# Patient Record
Sex: MALE | Race: WHITE
[De-identification: names, ages, dates, MRNs, and addresses within clinical notes are randomized per-mention and may not be internally consistent; named-entity substitution may affect disease eponyms.]

---

## 2019-07-26 ENCOUNTER — HOSPITAL ENCOUNTER (EMERGENCY)
Dept: HOSPITAL 65 - ER | Age: 24
Discharge: HOME | End: 2019-07-26
Payer: COMMERCIAL

## 2019-07-26 VITALS — SYSTOLIC BLOOD PRESSURE: 142 MMHG | DIASTOLIC BLOOD PRESSURE: 83 MMHG

## 2019-07-26 VITALS — WEIGHT: 180 LBS | BODY MASS INDEX: 25.2 KG/M2 | HEIGHT: 71 IN

## 2019-07-26 VITALS — SYSTOLIC BLOOD PRESSURE: 132 MMHG | DIASTOLIC BLOOD PRESSURE: 89 MMHG

## 2019-07-26 VITALS — SYSTOLIC BLOOD PRESSURE: 149 MMHG | DIASTOLIC BLOOD PRESSURE: 92 MMHG
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
## 2019-07-26 DIAGNOSIS — M54.2: ICD-10-CM
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
## 2019-07-26 DIAGNOSIS — S00.83XA: Primary | ICD-10-CM
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
## 2019-07-26 PROCEDURE — 99284 EMERGENCY DEPT VISIT MOD MDM: CPT

## 2019-07-26 PROCEDURE — 70450 CT HEAD/BRAIN W/O DYE: CPT

## 2019-07-26 PROCEDURE — 72125 CT NECK SPINE W/O DYE: CPT

## 2019-07-26 NOTE — DIREP
PROCEDURE:CT CERVICAL SPINE WITHOUT CONTRAST

 

TECHNIQUE:Axial cuts were obtained through the cervical spine.  The images 

were viewed at bone and soft tissue settings.  Sagittal and coronal 

reconstructions are provided.  

 

COMPARISON:[facility], CT, CT HEAD BRAIN W/O CONTRAST, 

07/26/2019, 10:38 PM.

 

INDICATIONS:fall off horse

 

FINDINGS:

ALIGNMENT: Normal.  No subluxation.

VERTEBRAE/DISCS: Normal.  No fracture or compression abnormality.  The 

craniocervical and cervicothoracic junctions are normal.  No disc or facet 

abnormality.

PARASPINAL AREA: Normal.  No prevertebral soft tissue swelling.

OTHER: No additional findings.

 

 

CONCLUSION:

No acute abnormality involving the cervical spine.  

 

 

 

Dictated by: Maximino Alatorre MD on 07/26/2019 at 11:15 PM     

Electronically Signed By: Maxiimno Alatorre MD on 07/26/2019 at 11:20 PM

## 2019-07-26 NOTE — DIREP
PROCEDURE:CT HEAD WITHOUT CONTRAST

 

TECHNIQUE:Axial cuts were obtained through the head, without intravenous 

contrast material.  The images were viewed at brain and bone settings.   

 

COMPARISON:None.

 

INDICATIONS:fall off horse and hit head

 

FINDINGS:

VENTRICLES:There is no hydrocephalus.  

CEREBRUM:There is no CT evidence of mass, hemorrhage, or acute infarct.  

CEREBELLUM:Normal.

BRAINSTEM:Normal.

SKULL:Normal.

SINUSES:Normal.

OTHER:Negative.

 

CONCLUSION:No acute abnormality is identified.  

 

 

 

 

Dictated by: Checo Gallegos MD on 07/26/2019 at 10:55 PM     

Electronically Signed By: Checo Gallegos MD on 07/26/2019 at 10:57 PM

## 2019-07-26 NOTE — ER.PDOC
General


Chief Complaint:  Requesting Medical Care


Stated Complaint:  POSS CONCUSSION


Time seen by MD:  22:27


Source:  patient


Exam Limitations:  no limitations





History of Present Illness


Initial Comments


patient was bucked off a horse about 1 hour ago, he states he fell off and hit 

his head mild neck pain no other injury or complaint. no loc, no back/arm/leg/ch

est or abd pain.


Where:  home


Severity:  moderate


Location:  frontal


Method of Injury:  fell





Review of Systems


Constitutional:  denies fever


Ears, Nose, Mouth, Throat:  denies ear pain


Respiratory:  denies cough, denies shortness of breath


Cardiovascular:  denies chest pain, denies palpitations


Gastrointestinal:  denies abdominal pain, denies diarrhea


Genitourinary:  denies dysuria, denies hematuria


Musculoskeletal:  denies back pain, denies joint pain, denies muscle pain; neck 

pain


Skin:  denies rash


Psychiatric/Neurological:  headache





Physical Exam


General Appearance:  Alert


Head:  Contusions, Swelling


Eye:  PERRL, EOMI


ENT:  Nml external inspection, Pharynx nml


Cardiovascular/Respiratory:  Regular Rate, Rhythm, No M/R/G


Gastrointestinal:  Non Tender


Extremities:  Normal Range of Motion, Non-Tender


NEURO/PSYCH:  Alert, Oriented x3


Cranial Nerves:  Normal Hearing, Normal Speech, PERRL


Motor/Sensory:  No Motor Deficit, No Sensory Deficit


Comments


contusion left forehead and, tms normal mild neck pain to palp post, no back 

pain no chest/abd/extremity pain to palpation, neuro intact.





[place] Coma Score


Best Eye Response:  (4) Open Spontaneously


Best Verbal Response:  (5) Oriented


Best Motor Response:  (6) Obeys Commands





Results/Orders


Results/Orders





Orders - MARIYA SANDRA MD


Ct Head Wo Contrast (7/26/19 22:30)


Ct Cervical Spine (7/26/19 22:30)


Tramadol Hcl (Ultram) (7/26/19 23:28)


Tramadol Hcl (Ultram) (7/26/19 23:31)








Departure


Time of Disposition:  23:36


Disposition:  01 HOME, SELF-CARE


Impression:  


   Primary Impression:  


   Head injury


Condition:  Improved


Patient Instructions:  Head Injury, Adult


Referrals:  


PCP,UNKNOWN (PCP)


PRIMARY CARE PROVIDER





Additional Instructions:  


return for any worsening symptoms.


Duration or Time Spent with Pa:  15











MARIYA SANDRA MD           Jul 26, 2019 22:31

## 2020-07-30 ENCOUNTER — HOSPITAL ENCOUNTER (OUTPATIENT)
Dept: HOSPITAL 65 - LAB | Age: 25
Discharge: HOME | End: 2020-07-30
Attending: NURSE PRACTITIONER
Payer: COMMERCIAL

## 2020-07-30 ENCOUNTER — HOSPITAL ENCOUNTER (OUTPATIENT)
Dept: HOSPITAL 65 - RAD | Age: 25
Discharge: HOME | End: 2020-07-30
Attending: NURSE PRACTITIONER

## 2020-07-30 DIAGNOSIS — R17: ICD-10-CM

## 2020-07-30 DIAGNOSIS — K43.9: Primary | ICD-10-CM

## 2020-07-30 DIAGNOSIS — R82.90: ICD-10-CM

## 2020-07-30 DIAGNOSIS — R10.84: ICD-10-CM

## 2020-07-30 DIAGNOSIS — K72.90: ICD-10-CM

## 2020-07-30 DIAGNOSIS — F19.10: ICD-10-CM

## 2020-07-30 DIAGNOSIS — R10.9: ICD-10-CM

## 2020-07-30 LAB
ALP INTEST CFR SERPL: 101 U/L (ref 50–136)
ALT SERPL-CCNC: 61 U/L (ref 12–78)
AST SERPL-CCNC: 199 U/L (ref 0–35)
BASOPHILS # BLD AUTO: 0 10^3/UL (ref 0–0.1)
BASOPHILS NFR BLD AUTO: 0.2 % (ref 0–0.2)
CALCIUM SERPL-MCNC: 8 MG/DL (ref 8.4–10.5)
CHOLEST SERPL-MCNC: < 50 MG/DL (ref 120–240)
CO2 BLDA-SCNC: 24.9 MMOL/L (ref 20–32)
EOSINOPHIL # BLD AUTO: 0 10^3/UL (ref 0–0.2)
EOSINOPHIL NFR BLD AUTO: 0.3 % (ref 0–5)
EOSINOPHIL NFR BLD: 1 % (ref 1–4)
ERYTHROCYTE [DISTWIDTH] IN BLOOD BY AUTOMATED COUNT: 14.7 % (ref 11.5–14.5)
GLUCOSE PRE 100 G GLC PO SERPL-MCNC: 119 MG/DL (ref 70–110)
HDLC SERPL-MCNC: 10 MG/DL (ref 32–96)
LYMPHOCYTES # BLD AUTO: 0.73 10^3/UL1 (ref 1–4.8)
LYMPHOCYTES NFR BLD AUTO: 6.8 % (ref 24–44)
LYMPHOCYTES NFR BLD: 8 % (ref 25–36)
MANUAL DIF COMMENT BLD-IMP: NORMAL
MCH RBC QN AUTO: 36.4 PG (ref 26–34)
MONOCYTES # BLD AUTO: 2.6 10^3/UL (ref 0.3–0.8)
MONOCYTES NFR BLD AUTO: 24.3 % (ref 5–12)
MONOCYTES NFR BLD: 25 % (ref 3–9)
NEUTROPHILS # BLD AUTO: 7.2 10^3/UL (ref 1.8–7.7)
NEUTROPHILS NFR BLD AUTO: 67.1 % (ref 41–85)
NEUTS BAND NFR BLD: 1 % (ref 2–6)
NEUTS SEG NFR BLD: 65 % (ref 31–76)
PLATELET # BLD AUTO: 92 10^3/UL (ref 150–400)

## 2020-07-30 PROCEDURE — 76705 ECHO EXAM OF ABDOMEN: CPT

## 2020-07-30 PROCEDURE — 80061 LIPID PANEL: CPT

## 2020-07-30 PROCEDURE — 84439 ASSAY OF FREE THYROXINE: CPT

## 2020-07-30 PROCEDURE — 83036 HEMOGLOBIN GLYCOSYLATED A1C: CPT

## 2020-07-30 PROCEDURE — 84443 ASSAY THYROID STIM HORMONE: CPT

## 2020-07-30 PROCEDURE — 82248 BILIRUBIN DIRECT: CPT

## 2020-07-30 PROCEDURE — 76700 US EXAM ABDOM COMPLETE: CPT

## 2020-07-30 PROCEDURE — 80050 GENERAL HEALTH PANEL: CPT

## 2020-07-30 PROCEDURE — 80053 COMPREHEN METABOLIC PANEL: CPT

## 2020-07-30 PROCEDURE — 85025 COMPLETE CBC W/AUTO DIFF WBC: CPT

## 2020-07-30 PROCEDURE — 36415 COLL VENOUS BLD VENIPUNCTURE: CPT

## 2020-07-30 NOTE — DIREP
PROCEDURE:US ABDOMEN LIMITED (SINGLE ORGAN - QUAD)

 

COMPARISON:[facility], US, US ABDOMEN COMPLETE, 07/30/2020, 02:58 

PM.

 

INDICATIONS:VENTRAL HERNIA

 

TECHNIQUE:Sonography of the midline anterior abdominal wall was performed 

using grayscale and color Doppler imaging.

 

FINDINGS:

MASSES:None.

FLUID COLLECTIONS:None.

OTHER:There is a dilated vein seen at midline in the anterior abdominal wall 

in the region of palpable abnormality.  This is likely a cannulized 

paraumbilical vein/caput medusa related to underlying hepatocellular disease, 

abdomen complete performed separately.

 

CONCLUSION:

1. Cannulized paraumbilical vein/caput medusa in the region of palpable 

abnormality of the midline upper abdominal wall.

 

 

 

 

Dictated by: Rebecca Carter MD on 07/30/2020 at 03:27 PM     

Electronically Signed By: Rebecca Carter MD on 07/30/2020 at 03:29 PM

## 2020-07-30 NOTE — DIREP
PROCEDURE:COMPLETE ABDOMINAL ULTRASOUND

 

COMPARISON:None.

 

INDICATIONS:JAUNDICE, ABD PAIN

 

FINDINGS:

 

PANCREAS:The pancreas is largely obscured by bowel gas shadowing.  

LIVER:Enlarged.  Increased hepatic echotexture consistent with hepatic 

steatosis, a few images suggest liver surface nodularity.  No focal hepatic 

mass identified.  Reversal of flow in the portal vein, hepatofugal.

GALLBLADDER:Nondilated.  Diffusely thickened gallbladder wall.  

BILIARY:4 mm common bile duct.  There is no biliary ductal dilatation.  

RIGHT KIDNEY:12.5 cm.  Normal.  No hydronephrosis..

LEFT KIDNEY:12.1 cm.  Normal.  No hydronephrosis.

SPLEEN:Enlarged.

AORTA:Not visualized due to bowel gas.

INVERIOR VENA CAVA:Not visualized due to bowel gas.

OTHER:Moderate ascites is identified.  

 

CONCLUSION:

1. Enlarged liver with steatosis and several images suggesting surface 

nodularity, a clinical diagnosis of cirrhosis possible.  There is reversal of 

flow in the portal vein.  Dilated paraumbilical vein noted, caput medusa in the 

anterior abdominal wall.  Splenomegaly and moderate ascites.  Evaluation for 

acute liver failure should be considered.

 

This report was called by telephone at 3:36 pm on July 30, 2020 to Cinthia SCOTT                                                      .

 

 

Dictated by: Rebecca Carter MD on 07/30/2020 at 03:29 PM     

Electronically Signed By: Rebecca Carter MD on 07/30/2020 at 03:37 PM